# Patient Record
Sex: FEMALE | Race: WHITE | NOT HISPANIC OR LATINO | Employment: FULL TIME | URBAN - METROPOLITAN AREA
[De-identification: names, ages, dates, MRNs, and addresses within clinical notes are randomized per-mention and may not be internally consistent; named-entity substitution may affect disease eponyms.]

---

## 2017-04-26 ENCOUNTER — AMBULATORY - HEALTHEAST (OUTPATIENT)
Dept: HEALTH INFORMATION MANAGEMENT | Facility: CLINIC | Age: 32
End: 2017-04-26

## 2017-05-11 ENCOUNTER — HOME CARE/HOSPICE - HEALTHEAST (OUTPATIENT)
Dept: HOME HEALTH SERVICES | Facility: HOME HEALTH | Age: 32
End: 2017-05-11

## 2017-06-13 ENCOUNTER — COMMUNICATION - HEALTHEAST (OUTPATIENT)
Dept: OBGYN | Facility: HOSPITAL | Age: 32
End: 2017-06-13

## 2021-01-27 LAB
HEMOGLOBIN (EXTERNAL): 13.3 G/DL (ref 11.7–15.5)
HEPATITIS B SURFACE ANTIGEN (EXTERNAL): NONREACTIVE
HEPATITIS C ANTIBODY (EXTERNAL): NONREACTIVE
HIV1+2 AB SERPL QL IA: NONREACTIVE
PLATELET COUNT (EXTERNAL): 175 10E3/UL (ref 140–400)
RUBELLA ANTIBODY IGG (EXTERNAL): POSITIVE
TREPONEMA PALLIDUM ANTIBODY (EXTERNAL): NONREACTIVE
VDRL (SYPHILIS) (EXTERNAL): NONREACTIVE

## 2021-01-28 LAB
ABO (EXTERNAL): NORMAL
RH (EXTERNAL): POSITIVE

## 2021-06-16 PROBLEM — Z34.90 PREGNANT: Status: ACTIVE | Noted: 2019-10-06

## 2021-07-14 PROBLEM — Z34.90 PREGNANT: Status: RESOLVED | Noted: 2017-05-09 | Resolved: 2017-05-11

## 2021-07-16 LAB — GROUP B STREPTOCOCCUS (EXTERNAL): NEGATIVE

## 2021-08-11 ENCOUNTER — HOSPITAL ENCOUNTER (OUTPATIENT)
Facility: HOSPITAL | Age: 36
End: 2021-08-11
Payer: COMMERCIAL

## 2021-08-12 ENCOUNTER — HOSPITAL ENCOUNTER (INPATIENT)
Facility: HOSPITAL | Age: 36
LOS: 1 days | Discharge: HOME | End: 2021-08-13
Attending: FAMILY MEDICINE | Admitting: FAMILY MEDICINE
Payer: COMMERCIAL

## 2021-08-12 PROBLEM — Z34.90 PREGNANCY: Status: ACTIVE | Noted: 2021-08-12

## 2021-08-12 LAB
ABO/RH(D): NORMAL
ANTIBODY SCREEN: NEGATIVE
HGB BLD-MCNC: 12.6 G/DL (ref 11.7–15.7)
HOLD SPECIMEN: NORMAL
PLATELET # BLD AUTO: 113 10E3/UL (ref 150–450)
SARS-COV-2 RNA RESP QL NAA+PROBE: NEGATIVE
SPECIMEN EXPIRATION DATE: NORMAL

## 2021-08-12 PROCEDURE — 250N000013 HC RX MED GY IP 250 OP 250 PS 637: Performed by: FAMILY MEDICINE

## 2021-08-12 PROCEDURE — 85049 AUTOMATED PLATELET COUNT: CPT | Performed by: FAMILY MEDICINE

## 2021-08-12 PROCEDURE — 0UQMXZZ REPAIR VULVA, EXTERNAL APPROACH: ICD-10-PCS | Performed by: FAMILY MEDICINE

## 2021-08-12 PROCEDURE — 87635 SARS-COV-2 COVID-19 AMP PRB: CPT | Performed by: FAMILY MEDICINE

## 2021-08-12 PROCEDURE — 10907ZC DRAINAGE OF AMNIOTIC FLUID, THERAPEUTIC FROM PRODUCTS OF CONCEPTION, VIA NATURAL OR ARTIFICIAL OPENING: ICD-10-PCS | Performed by: FAMILY MEDICINE

## 2021-08-12 PROCEDURE — 86900 BLOOD TYPING SEROLOGIC ABO: CPT | Performed by: FAMILY MEDICINE

## 2021-08-12 PROCEDURE — 250N000009 HC RX 250

## 2021-08-12 PROCEDURE — 722N000001 HC LABOR CARE VAGINAL DELIVERY SINGLE

## 2021-08-12 PROCEDURE — 85018 HEMOGLOBIN: CPT | Performed by: FAMILY MEDICINE

## 2021-08-12 PROCEDURE — 120N000001 HC R&B MED SURG/OB

## 2021-08-12 PROCEDURE — 36415 COLL VENOUS BLD VENIPUNCTURE: CPT | Performed by: FAMILY MEDICINE

## 2021-08-12 PROCEDURE — 0HQ9XZZ REPAIR PERINEUM SKIN, EXTERNAL APPROACH: ICD-10-PCS | Performed by: FAMILY MEDICINE

## 2021-08-12 PROCEDURE — 86780 TREPONEMA PALLIDUM: CPT | Performed by: FAMILY MEDICINE

## 2021-08-12 PROCEDURE — 250N000011 HC RX IP 250 OP 636: Performed by: FAMILY MEDICINE

## 2021-08-12 RX ORDER — NALOXONE HYDROCHLORIDE 0.4 MG/ML
0.4 INJECTION, SOLUTION INTRAMUSCULAR; INTRAVENOUS; SUBCUTANEOUS
Status: DISCONTINUED | OUTPATIENT
Start: 2021-08-12 | End: 2021-08-12 | Stop reason: HOSPADM

## 2021-08-12 RX ORDER — MISOPROSTOL 200 UG/1
400 TABLET ORAL
Status: DISCONTINUED | OUTPATIENT
Start: 2021-08-12 | End: 2021-08-13 | Stop reason: HOSPADM

## 2021-08-12 RX ORDER — PROCHLORPERAZINE 25 MG
25 SUPPOSITORY, RECTAL RECTAL EVERY 12 HOURS PRN
Status: DISCONTINUED | OUTPATIENT
Start: 2021-08-12 | End: 2021-08-12 | Stop reason: HOSPADM

## 2021-08-12 RX ORDER — OXYTOCIN 10 [USP'U]/ML
10 INJECTION, SOLUTION INTRAMUSCULAR; INTRAVENOUS
Status: COMPLETED | OUTPATIENT
Start: 2021-08-12 | End: 2021-08-12

## 2021-08-12 RX ORDER — OXYTOCIN 10 [USP'U]/ML
10 INJECTION, SOLUTION INTRAMUSCULAR; INTRAVENOUS
Status: DISCONTINUED | OUTPATIENT
Start: 2021-08-12 | End: 2021-08-13 | Stop reason: HOSPADM

## 2021-08-12 RX ORDER — BISACODYL 10 MG
10 SUPPOSITORY, RECTAL RECTAL DAILY PRN
Status: DISCONTINUED | OUTPATIENT
Start: 2021-08-12 | End: 2021-08-13 | Stop reason: HOSPADM

## 2021-08-12 RX ORDER — MODIFIED LANOLIN
OINTMENT (GRAM) TOPICAL
Status: DISCONTINUED | OUTPATIENT
Start: 2021-08-12 | End: 2021-08-13 | Stop reason: HOSPADM

## 2021-08-12 RX ORDER — ONDANSETRON 4 MG/1
4 TABLET, ORALLY DISINTEGRATING ORAL EVERY 6 HOURS PRN
Status: DISCONTINUED | OUTPATIENT
Start: 2021-08-12 | End: 2021-08-12 | Stop reason: HOSPADM

## 2021-08-12 RX ORDER — MISOPROSTOL 200 UG/1
400 TABLET ORAL
Status: DISCONTINUED | OUTPATIENT
Start: 2021-08-12 | End: 2021-08-12 | Stop reason: HOSPADM

## 2021-08-12 RX ORDER — OXYTOCIN/0.9 % SODIUM CHLORIDE 30/500 ML
340 PLASTIC BAG, INJECTION (ML) INTRAVENOUS CONTINUOUS PRN
Status: DISCONTINUED | OUTPATIENT
Start: 2021-08-12 | End: 2021-08-13 | Stop reason: HOSPADM

## 2021-08-12 RX ORDER — OXYTOCIN 10 [USP'U]/ML
INJECTION, SOLUTION INTRAMUSCULAR; INTRAVENOUS
Status: DISPENSED
Start: 2021-08-12 | End: 2021-08-13

## 2021-08-12 RX ORDER — KETOROLAC TROMETHAMINE 30 MG/ML
30 INJECTION, SOLUTION INTRAMUSCULAR; INTRAVENOUS
Status: DISCONTINUED | OUTPATIENT
Start: 2021-08-12 | End: 2021-08-13 | Stop reason: HOSPADM

## 2021-08-12 RX ORDER — METHYLERGONOVINE MALEATE 0.2 MG/ML
200 INJECTION INTRAVENOUS
Status: DISCONTINUED | OUTPATIENT
Start: 2021-08-12 | End: 2021-08-12 | Stop reason: HOSPADM

## 2021-08-12 RX ORDER — ONDANSETRON 2 MG/ML
4 INJECTION INTRAMUSCULAR; INTRAVENOUS EVERY 6 HOURS PRN
Status: DISCONTINUED | OUTPATIENT
Start: 2021-08-12 | End: 2021-08-12 | Stop reason: HOSPADM

## 2021-08-12 RX ORDER — IBUPROFEN 600 MG/1
600 TABLET, FILM COATED ORAL
Status: DISCONTINUED | OUTPATIENT
Start: 2021-08-12 | End: 2021-08-13 | Stop reason: HOSPADM

## 2021-08-12 RX ORDER — SODIUM CHLORIDE, SODIUM LACTATE, POTASSIUM CHLORIDE, CALCIUM CHLORIDE 600; 310; 30; 20 MG/100ML; MG/100ML; MG/100ML; MG/100ML
INJECTION, SOLUTION INTRAVENOUS CONTINUOUS
Status: DISCONTINUED | OUTPATIENT
Start: 2021-08-12 | End: 2021-08-12 | Stop reason: HOSPADM

## 2021-08-12 RX ORDER — LIDOCAINE HYDROCHLORIDE 10 MG/ML
INJECTION, SOLUTION EPIDURAL; INFILTRATION; INTRACAUDAL; PERINEURAL
Status: COMPLETED
Start: 2021-08-12 | End: 2021-08-12

## 2021-08-12 RX ORDER — HYDROCORTISONE 2.5 %
CREAM (GRAM) TOPICAL 3 TIMES DAILY PRN
Status: DISCONTINUED | OUTPATIENT
Start: 2021-08-12 | End: 2021-08-13 | Stop reason: HOSPADM

## 2021-08-12 RX ORDER — IBUPROFEN 800 MG/1
800 TABLET, FILM COATED ORAL EVERY 6 HOURS PRN
Status: DISCONTINUED | OUTPATIENT
Start: 2021-08-12 | End: 2021-08-13 | Stop reason: HOSPADM

## 2021-08-12 RX ORDER — LIDOCAINE HYDROCHLORIDE 20 MG/ML
SOLUTION OROPHARYNGEAL
Status: DISCONTINUED
Start: 2021-08-12 | End: 2021-08-12 | Stop reason: WASHOUT

## 2021-08-12 RX ORDER — METOCLOPRAMIDE HYDROCHLORIDE 5 MG/ML
10 INJECTION INTRAMUSCULAR; INTRAVENOUS EVERY 6 HOURS PRN
Status: DISCONTINUED | OUTPATIENT
Start: 2021-08-12 | End: 2021-08-12 | Stop reason: HOSPADM

## 2021-08-12 RX ORDER — OXYTOCIN/0.9 % SODIUM CHLORIDE 30/500 ML
100-340 PLASTIC BAG, INJECTION (ML) INTRAVENOUS CONTINUOUS PRN
Status: DISCONTINUED | OUTPATIENT
Start: 2021-08-12 | End: 2021-08-13 | Stop reason: HOSPADM

## 2021-08-12 RX ORDER — CARBOPROST TROMETHAMINE 250 UG/ML
250 INJECTION, SOLUTION INTRAMUSCULAR
Status: DISCONTINUED | OUTPATIENT
Start: 2021-08-12 | End: 2021-08-12 | Stop reason: HOSPADM

## 2021-08-12 RX ORDER — MISOPROSTOL 200 UG/1
800 TABLET ORAL
Status: DISCONTINUED | OUTPATIENT
Start: 2021-08-12 | End: 2021-08-13 | Stop reason: HOSPADM

## 2021-08-12 RX ORDER — CARBOPROST TROMETHAMINE 250 UG/ML
250 INJECTION, SOLUTION INTRAMUSCULAR
Status: DISCONTINUED | OUTPATIENT
Start: 2021-08-12 | End: 2021-08-13 | Stop reason: HOSPADM

## 2021-08-12 RX ORDER — OXYTOCIN/0.9 % SODIUM CHLORIDE 30/500 ML
340 PLASTIC BAG, INJECTION (ML) INTRAVENOUS CONTINUOUS PRN
Status: DISCONTINUED | OUTPATIENT
Start: 2021-08-12 | End: 2021-08-12 | Stop reason: HOSPADM

## 2021-08-12 RX ORDER — METHYLERGONOVINE MALEATE 0.2 MG/ML
200 INJECTION INTRAVENOUS
Status: DISCONTINUED | OUTPATIENT
Start: 2021-08-12 | End: 2021-08-13 | Stop reason: HOSPADM

## 2021-08-12 RX ORDER — ACETAMINOPHEN 325 MG/1
650 TABLET ORAL EVERY 4 HOURS PRN
Status: DISCONTINUED | OUTPATIENT
Start: 2021-08-12 | End: 2021-08-13 | Stop reason: HOSPADM

## 2021-08-12 RX ORDER — MISOPROSTOL 200 UG/1
800 TABLET ORAL
Status: DISCONTINUED | OUTPATIENT
Start: 2021-08-12 | End: 2021-08-12 | Stop reason: HOSPADM

## 2021-08-12 RX ORDER — LIDOCAINE 40 MG/G
CREAM TOPICAL
Status: DISCONTINUED | OUTPATIENT
Start: 2021-08-12 | End: 2021-08-12 | Stop reason: HOSPADM

## 2021-08-12 RX ORDER — NALOXONE HYDROCHLORIDE 0.4 MG/ML
0.2 INJECTION, SOLUTION INTRAMUSCULAR; INTRAVENOUS; SUBCUTANEOUS
Status: DISCONTINUED | OUTPATIENT
Start: 2021-08-12 | End: 2021-08-12 | Stop reason: HOSPADM

## 2021-08-12 RX ORDER — DOCUSATE SODIUM 100 MG/1
100 CAPSULE, LIQUID FILLED ORAL DAILY
Status: DISCONTINUED | OUTPATIENT
Start: 2021-08-12 | End: 2021-08-13 | Stop reason: HOSPADM

## 2021-08-12 RX ORDER — METOCLOPRAMIDE 10 MG/1
10 TABLET ORAL EVERY 6 HOURS PRN
Status: DISCONTINUED | OUTPATIENT
Start: 2021-08-12 | End: 2021-08-12 | Stop reason: HOSPADM

## 2021-08-12 RX ORDER — PROCHLORPERAZINE MALEATE 10 MG
10 TABLET ORAL EVERY 6 HOURS PRN
Status: DISCONTINUED | OUTPATIENT
Start: 2021-08-12 | End: 2021-08-12 | Stop reason: HOSPADM

## 2021-08-12 RX ORDER — MISOPROSTOL 200 UG/1
TABLET ORAL
Status: DISCONTINUED
Start: 2021-08-12 | End: 2021-08-12 | Stop reason: WASHOUT

## 2021-08-12 RX ADMIN — IBUPROFEN 600 MG: 600 TABLET, FILM COATED ORAL at 18:51

## 2021-08-12 RX ADMIN — OXYTOCIN 10 UNITS: 10 INJECTION INTRAVENOUS at 17:58

## 2021-08-12 RX ADMIN — DOCUSATE SODIUM 100 MG: 100 CAPSULE, LIQUID FILLED ORAL at 22:26

## 2021-08-12 RX ADMIN — ACETAMINOPHEN 650 MG: 325 TABLET ORAL at 22:27

## 2021-08-12 RX ADMIN — LIDOCAINE HYDROCHLORIDE 300 MG: 10 INJECTION, SOLUTION EPIDURAL; INFILTRATION; INTRACAUDAL; PERINEURAL at 18:04

## 2021-08-12 ASSESSMENT — ACTIVITIES OF DAILY LIVING (ADL)
FALL_HISTORY_WITHIN_LAST_SIX_MONTHS: NO
CONCENTRATING,_REMEMBERING_OR_MAKING_DECISIONS_DIFFICULTY: NO
TOILETING_ISSUES: NO
DIFFICULTY_COMMUNICATING: NO
DOING_ERRANDS_INDEPENDENTLY_DIFFICULTY: NO
DRESSING/BATHING_DIFFICULTY: NO
WALKING_OR_CLIMBING_STAIRS_DIFFICULTY: NO
HEARING_DIFFICULTY_OR_DEAF: NO
DIFFICULTY_EATING/SWALLOWING: NO
WEAR_GLASSES_OR_BLIND: NO
PATIENT_/_FAMILY_COMMUNICATION_STYLE: SPOKEN LANGUAGE (ENGLISH OR BILINGUAL)

## 2021-08-12 ASSESSMENT — ENCOUNTER SYMPTOMS: BACK PAIN: 1

## 2021-08-12 ASSESSMENT — MIFFLIN-ST. JEOR: SCORE: 1511.01

## 2021-08-12 NOTE — PROGRESS NOTES
1653: AROM'd at this time. Clear fluid, moderate amount.     1710: Pt in the water birth tub at this time. Dr Chapman at the bedside.     174: Pt pushing with contractions at this time.     175: Cervix complete at this time.     1753: Delivery of  girl, Apgar's 7,9.    1759: Delivery of placenta at this time.

## 2021-08-12 NOTE — H&P
"Amy Rose is an 35 year old female  at 40w2d who presents with contractions since her membranes were stripped in clinic this morning.  She is feeling pressure and desires a water birth.      Prenatal record is reviewed.    OB History    Para Term  AB Living   5 4 4 0 0 4   SAB TAB Ectopic Multiple Live Births   0 0 0 0 4      # Outcome Date GA Lbr Baljit/2nd Weight Sex Delivery Anes PTL Lv   5 Current            4 Term 10/06/19 40w6d 10:18 / 00:03 4.252 kg (9 lb 6 oz) F Vag-Spont Local N JOSIE      Name: INGRIS ROSE      Apgar1: 9  Apgar5: 9   3 Term 17 41w1d 14:39 / 00:02 3.54 kg (7 lb 12.9 oz) F Waterbirth None N JOSIE      Name: NURY ROSE-AMY      Apgar1: 8  Apgar5: 9   2 Term /14 39w6d / 00:11 3.912 kg (8 lb 10 oz) F Vag-Spont Local N JOSIE      Name: NURY ROSE-AMY      Apgar1: 8  Apgar5: 9   1 Term 13   3.175 kg (7 lb) F Vag-Spont   JSOIE       Social History     Tobacco Use     Smoking status: Never Smoker     Smokeless tobacco: Never Used   Substance Use Topics     Alcohol use: Yes     Comment: 1 glass a week      Drug use: No       History reviewed. No pertinent past medical history.    Allergies:   Allergies   Allergen Reactions     Penicillins Hives       Active Problems:    Pregnancy    Blood pressure 129/85, pulse 107, temperature 98.7  F (37.1  C), temperature source Oral, resp. rate 18, height 1.727 m (5' 8\"), weight 75.8 kg (167 lb), SpO2 98 %, currently breastfeeding.    Review of Systems   Musculoskeletal: Positive for back pain.       Physical Exam  Constitutional:       Appearance: Normal appearance.   Pulmonary:      Effort: Pulmonary effort is normal.   Abdominal:      Comments: gravid   Neurological:      Mental Status: She is oriented to person, place, and time.   Psychiatric:         Mood and Affect: Mood normal.     Pelvis: cephalic presentation  Cervix:  5/75%/-2 bulging bag  EFM:  Category 1  [place]: q2-4 minutes    Assessment:  Active " Labor    Plan:  SROM, waterbirth    Keesha West MD  8/12/2021

## 2021-08-12 NOTE — L&D DELIVERY NOTE
OB Vaginal Delivery Note    Amy Smith MRN# [id number]   Age: 35 year old YOB: 1985       GA: 40w2d  GP:   Labor Complications: None   EBL:   mL  Delivery QBL: 229 mL  Delivery Type: Vaginal, Spontaneous   ROM to Delivery Time: (Delivered) Hours: 1 Minutes: 0  [place] Weight:     1 Minute 5 Minute 10 Minute   Apgar Totals: 7   9             Delivery Details:  Amy Smith, a 35 year old  female delivered a viable infant with apgars of 7  and 9 . Patient was fully dilated and pushing after 5 hours 42 minutes in active labor. Delivery was via vaginal, spontaneous  to a sterile field under local  anesthesia. Infant delivered in   right  occiput  anterior  position. Anterior and posterior shoulders delivered without difficulty. The cord was clamped, cut twice and 3 vessels  were noted. Cord blood was obtained in routine fashion with the following disposition: discard .      Cord complications: none   Placenta delivered at 2021  5:59 PM . Placental disposition was Hospital disposal . Fundal massage performed and fundus found to be firm.     Episiotomy: none    Perineum, vagina, cervix were inspected, and the following lacerations were noted:   Perineal lacerations: 1st   periurethral laceration: bilateral             Any lacerations were repaired in the usual fashion using 3-0 Vicryl.    Excellent hemostasis was noted. Needle count correct. Infant and patient in delivery room in good and stable condition.        Sarah Female-Amy [[id number]]    Labor Event Times    Labor onset date: 21 Onset time: 12:00 PM   Dilation complete date: 21 Complete time:  5:52 PM   Start pushing date/time: 2021 1742      Labor Events     labor?: No   steroids: None  Labor Type: AROM     Antibiotics received during labor?: No     Rupture date/time: 21 1653   Rupture type: Artificial Rupture of Membranes  Fluid color: Clear  Fluid odor: Normal      Augmentation: AROM  Indications for augmentation: Ineffective Contraction Pattern     Delivery/Placenta Date and Time    Delivery Date: 21 Delivery Time:  5:53 PM   Placenta Date/Time: 2021  5:59 PM  Oxytocin given at the time of delivery: after delivery of baby  Delivering clinician: Keesha West MD          Vaginal Counts     Initial count performed by 2 team members:  Two Team Members   Dr Adela JOHNSTON RN       Needles Suture Needles Sponges (RETIRED) Instruments   Initial counts 0 0 0    Added to count 1 1 5    Relief counts       Final counts 1 1 5          Placed during labor Accounted for at the end of labor   FSE NA NA   IUPC NA NA   Cervadil NA NA              Final count performed by 2 team members:  Two Team Members   STEVEN Adamson Dr      Final count correct?: Yes     Apgars    Living status: Living   1 Minute 5 Minute 10 Minute 15 Minute 20 Minute   Skin color: 0  1       Heart rate: 2  2       Reflex irritability: 1  2       Muscle tone: 2  2       Respiratory effort: 2  2       Total: 7  9       Apgars assigned by: SOLOMON LANDAVERDE RN     Cord    Vessels: 3 Vessels    Cord Complications: None               Cord Blood Disposition: Discard    Gases Sent?: No    Delayed cord clamping?: Yes    Stem cell collection?: No        Resuscitation    Methods: None     Skin to Skin and Feeding Plan    Skin to skin initiation date/time: 1841    Skin to skin with: Mother  Skin to skin end date/time:        Labor Events and Shoulder Dystocia    Fetal Tracing Prior to Delivery: Category 1  Shoulder dystocia present?: Neg     Delivery (Maternal) (Provider to Complete) ([id number])    Episiotomy: None  Perineal lacerations: 1st Repaired?: Yes   Periurethral laceration: bilateral Repaired?: No   Repair suture: 3-0 Vicryl  Number of repair packets: 1  Genital tract inspection done: Pos     Blood Loss  Mother: Amy Smith #[id number]   Start of Mother's Information    Delivery  Blood Loss  08/12/21 1200 - 08/12/21 1853    Delivery QBL (mL) Hospital Encounter 229 mL    Total  229 mL         End of Mother's Information  Mother: Amy Smith #[id number]          Delivery - Provider to Complete ([id number])    Delivering clinician: Keesha West MD  Delivery Type (Choose the 1 that will go to the Birth History): Vaginal, Spontaneous                                 Placenta    Date/Time: 8/12/2021  5:59 PM  Removal: Spontaneous  Disposition: Hospital disposal           Anesthesia    Method: Local     Analgesic:  BIRTH HISTORY: ANALGESIC   LIDOCAINE 1 % INJECTION             Presentation and Position    Position: Right Occiput Anterior                 Keesha West MD

## 2021-08-13 VITALS
SYSTOLIC BLOOD PRESSURE: 100 MMHG | OXYGEN SATURATION: 98 % | DIASTOLIC BLOOD PRESSURE: 74 MMHG | WEIGHT: 167 LBS | BODY MASS INDEX: 25.31 KG/M2 | HEIGHT: 68 IN | HEART RATE: 70 BPM | TEMPERATURE: 98.1 F | RESPIRATION RATE: 18 BRPM

## 2021-08-13 PROBLEM — Z34.90 PREGNANCY: Status: RESOLVED | Noted: 2021-08-12 | Resolved: 2021-08-13

## 2021-08-13 PROBLEM — Z34.90 PREGNANT: Status: RESOLVED | Noted: 2019-10-06 | Resolved: 2021-08-13

## 2021-08-13 LAB
HGB BLD-MCNC: 11.4 G/DL (ref 11.7–15.7)
T PALLIDUM AB SER QL: NEGATIVE

## 2021-08-13 PROCEDURE — 85018 HEMOGLOBIN: CPT | Performed by: FAMILY MEDICINE

## 2021-08-13 PROCEDURE — 250N000013 HC RX MED GY IP 250 OP 250 PS 637: Performed by: FAMILY MEDICINE

## 2021-08-13 PROCEDURE — 36415 COLL VENOUS BLD VENIPUNCTURE: CPT | Performed by: FAMILY MEDICINE

## 2021-08-13 RX ORDER — IBUPROFEN 200 MG
800 TABLET ORAL EVERY 6 HOURS PRN
COMMUNITY
Start: 2021-08-13

## 2021-08-13 RX ORDER — ACETAMINOPHEN 325 MG/1
650 TABLET ORAL EVERY 4 HOURS PRN
COMMUNITY
Start: 2021-08-13

## 2021-08-13 RX ORDER — CHOLECALCIFEROL (VITAMIN D3) 50 MCG
1 TABLET ORAL DAILY
COMMUNITY
Start: 2021-08-13

## 2021-08-13 RX ADMIN — WITCH HAZEL 1 EACH: 500 SOLUTION RECTAL; TOPICAL at 09:00

## 2021-08-13 RX ADMIN — IBUPROFEN 800 MG: 800 TABLET, FILM COATED ORAL at 02:18

## 2021-08-13 RX ADMIN — ACETAMINOPHEN 650 MG: 325 TABLET ORAL at 04:55

## 2021-08-13 RX ADMIN — IBUPROFEN 800 MG: 800 TABLET, FILM COATED ORAL at 15:24

## 2021-08-13 RX ADMIN — ACETAMINOPHEN 650 MG: 325 TABLET ORAL at 08:59

## 2021-08-13 RX ADMIN — ACETAMINOPHEN 650 MG: 325 TABLET ORAL at 15:23

## 2021-08-13 RX ADMIN — DOCUSATE SODIUM 100 MG: 100 CAPSULE, LIQUID FILLED ORAL at 09:00

## 2021-08-13 RX ADMIN — Medication 1 G: at 08:58

## 2021-08-13 RX ADMIN — IBUPROFEN 800 MG: 800 TABLET, FILM COATED ORAL at 08:59

## 2021-08-13 RX ADMIN — Medication 1 APPLICATOR: at 09:01

## 2021-08-13 NOTE — PROGRESS NOTES
"Postpartum Progress Note:  Vaginal delivery  Day 1    SUBJECTIVE: Amy Smith is a 35 year old female  who   is feeling well.  Pain is well controlled. She is eating, ambulating and voiding without difficulty.  She is breast feeding.  She has no concerns. Baby is high intermediate risk zone for bilirubin.    OBJECTIVE:   /74   Pulse 70   Temp 98.1  F (36.7  C) (Axillary)   Resp 18   Ht 1.727 m (5' 8\")   Wt 75.8 kg (167 lb)   SpO2 98%   Breastfeeding Yes   BMI 25.39 kg/m      Alert no apparent distress   Fundus is firm  2 fingerbreadths below umbilicus  Extremities: edema trace     Lab Results   Component Value Date    HGB 11.4 (L) 2021         ASSESSMENT:  Normal delivery    Condition:  stabilized    PLAN:    Home tonight    Keesha West MD  2021 6:14 PM     "

## 2021-08-13 NOTE — PROGRESS NOTES
2019: Back in room 2 at this time from [facility] birth room. Pt transfered in wheelchair and holding infant.

## 2021-08-13 NOTE — PLAN OF CARE
Problem: Adjustment to Role Transition (Postpartum Vaginal Delivery)  Goal: Successful Maternal Role Transition  Outcome: Improving     Problem: Bleeding (Postpartum Vaginal Delivery)  Goal: Hemostasis  Outcome: Improving     Problem: Infection (Postpartum Vaginal Delivery)  Goal: Absence of Infection Signs and Symptoms  Outcome: Improving     Problem: Pain (Postpartum Vaginal Delivery)  Goal: Acceptable Pain Control  Outcome: Improving     Problem: Urinary Retention (Postpartum Vaginal Delivery)  Goal: Effective Urinary Elimination  Outcome: Improving     Patient's vital signs are within normal limits. Patient ambulates independently and participates in infant cares and feeds. Patient states pain is adequately controlled with PRN Motrin and Tylenol and aqua K pad. Patient is breastfeeding infant.

## 2021-08-14 NOTE — DISCHARGE SUMMARY
[facility] Discharge Summary    Amy Smith MRN# [id number]   Age: 35 year old YOB: 1985     Date of Admission:  8/12/2021  Date of Discharge::  8/13/2021   Admitting Physician:  Keesha West MD  Discharge Physician:  Keesha West MD     Home clinic: [facility]            Admission Diagnoses:   Pregnancy          Discharge Diagnosis:   Principal Problem:    Normal delivery              Procedures:   Procedure(s):                   Medications Prior to Admission:     Medications Prior to Admission   Medication Sig Dispense Refill Last Dose     b complex vitamins tablet [B COMPLEX VITAMINS TABLET] Take 1 tablet by mouth daily.        calcium-magnesium-zinc Tab [CALCIUM-MAGNESIUM-ZINC TAB] Take 1 tablet by mouth daily.        magnesium citrate solution [MAGNESIUM CITRATE SOLUTION] Take 296 mL by mouth daily as needed.        PRENATAL VIT W-CA,FE,FA,<1 MG, (PRENATAL VITAMIN ORAL) [PRENATAL VIT W-CA,FE,FA,<1 MG, (PRENATAL VITAMIN ORAL)] Take 1 tablet by mouth.        [DISCONTINUED] ergocalciferol (VITAMIN D2) 50,000 unit capsule [ERGOCALCIFEROL (VITAMIN D2) 50,000 UNIT CAPSULE] Take 2,000 Units by mouth daily.                Discharge Medications:     Current Discharge Medication List      START taking these medications    Details   acetaminophen (TYLENOL) 325 MG tablet Take 2 tablets (650 mg) by mouth every 4 hours as needed for mild pain or fever (greater than or equal to 38  C /100.4  F (oral) or 38.5  C/ 101.4  F (core).)    Associated Diagnoses: Normal delivery      ibuprofen (ADVIL/MOTRIN) 200 MG tablet Take 4 tablets (800 mg) by mouth every 6 hours as needed for other (cramping)    Associated Diagnoses: Normal delivery         CONTINUE these medications which have NOT CHANGED    Details   vitamin D3 (CHOLECALCIFEROL) 50 mcg (2000 units) tablet Take 1 tablet (50 mcg) by mouth daily      b complex vitamins tablet [B COMPLEX VITAMINS TABLET] Take 1  tablet by mouth daily.      calcium-magnesium-zinc Tab [CALCIUM-MAGNESIUM-ZINC TAB] Take 1 tablet by mouth daily.      magnesium citrate solution [MAGNESIUM CITRATE SOLUTION] Take 296 mL by mouth daily as needed.      PRENATAL VIT W-CA,FE,FA,<1 MG, (PRENATAL VITAMIN ORAL) [PRENATAL VIT W-CA,FE,FA,<1 MG, (PRENATAL VITAMIN ORAL)] Take 1 tablet by mouth.         STOP taking these medications       ergocalciferol (VITAMIN D2) 50,000 unit capsule Comments:   Reason for Stopping:                     Consultations:   No consultations were requested during this admission          Brief History of Labor:   Uncomplicated labor augmented with SROM, waterbirth, and postpartum recover.           Hospital Course:   The patient's hospital course was unremarkable.  On discharge, her pain was well controlled. Vaginal bleeding is similar to peak menstrual flow.  Voiding without difficulty.  Ambulating well and tolerating a normal diet.  No fever.  Breastfeeding well.  Infant is stable.  She was discharged on post-partum day #1.    Post-partum hemoglobin:   Hemoglobin   Date Value Ref Range Status   08/13/2021 11.4 (L) 11.7 - 15.7 g/dL Final             Discharge Instructions and Follow-Up:   Discharge diet: Regular   Discharge activity: Activity as tolerated   Discharge follow-up: Follow up with primary care provider in 6 weeks   Wound care:            Discharge Disposition:   Discharged to home      Attestation:      Keesha West MD